# Patient Record
Sex: FEMALE | Race: WHITE | NOT HISPANIC OR LATINO | Employment: STUDENT | URBAN - METROPOLITAN AREA
[De-identification: names, ages, dates, MRNs, and addresses within clinical notes are randomized per-mention and may not be internally consistent; named-entity substitution may affect disease eponyms.]

---

## 2021-05-08 ENCOUNTER — HOSPITAL ENCOUNTER (EMERGENCY)
Facility: CLINIC | Age: 21
Discharge: HOME | End: 2021-05-08
Attending: EMERGENCY MEDICINE | Admitting: EMERGENCY MEDICINE
Payer: COMMERCIAL

## 2021-05-08 ENCOUNTER — APPOINTMENT (OUTPATIENT)
Dept: GENERAL RADIOLOGY | Facility: CLINIC | Age: 21
End: 2021-05-08
Attending: EMERGENCY MEDICINE
Payer: COMMERCIAL

## 2021-05-08 VITALS
HEIGHT: 65 IN | BODY MASS INDEX: 21.66 KG/M2 | HEART RATE: 94 BPM | TEMPERATURE: 97.9 F | OXYGEN SATURATION: 98 % | RESPIRATION RATE: 16 BRPM | WEIGHT: 130 LBS | SYSTOLIC BLOOD PRESSURE: 122 MMHG | DIASTOLIC BLOOD PRESSURE: 87 MMHG

## 2021-05-08 DIAGNOSIS — S93.402A SPRAIN OF LEFT ANKLE, UNSPECIFIED LIGAMENT, INITIAL ENCOUNTER: ICD-10-CM

## 2021-05-08 PROCEDURE — 73610 X-RAY EXAM OF ANKLE: CPT | Mod: 26 | Performed by: RADIOLOGY

## 2021-05-08 PROCEDURE — 250N000013 HC RX MED GY IP 250 OP 250 PS 637: Performed by: EMERGENCY MEDICINE

## 2021-05-08 PROCEDURE — 99284 EMERGENCY DEPT VISIT MOD MDM: CPT | Performed by: EMERGENCY MEDICINE

## 2021-05-08 PROCEDURE — 73610 X-RAY EXAM OF ANKLE: CPT | Mod: LT

## 2021-05-08 RX ORDER — IBUPROFEN 600 MG/1
600 TABLET, FILM COATED ORAL ONCE
Status: COMPLETED | OUTPATIENT
Start: 2021-05-08 | End: 2021-05-08

## 2021-05-08 RX ADMIN — IBUPROFEN 600 MG: 600 TABLET, FILM COATED ORAL at 12:02

## 2021-05-08 ASSESSMENT — ENCOUNTER SYMPTOMS
ABDOMINAL PAIN: 0
NAUSEA: 0
COUGH: 0
HEADACHES: 0
VOMITING: 0
DIFFICULTY URINATING: 0
SORE THROAT: 0
BACK PAIN: 0
SHORTNESS OF BREATH: 0
NECK PAIN: 0
FEVER: 0
DYSURIA: 0
EYE REDNESS: 0
SLEEP DISTURBANCE: 0

## 2021-05-08 ASSESSMENT — MIFFLIN-ST. JEOR: SCORE: 1355.56

## 2021-05-08 NOTE — DISCHARGE INSTRUCTIONS
Wear gel splint.  Use crutches-weightbearing as tolerated.  Ice for 20 minutes at a time, 3-4 times a day and as needed.  Take ibuprofen 600 mg every 6 hours with food as needed.    Follow-up with your primary care provider in 1 week.    Return to the emergency department if any concerns.

## 2021-05-08 NOTE — LETTER
May 8, 2021      To Whom It May Concern:      Julia Briones was seen in our Emergency Department today, 05/08/21.  She was in the emergency department from 10:20 AM until approximately 12:20 PM.  She may return to school.    Sincerely,        NADEEN MANNING MD, MD

## 2021-05-08 NOTE — ED TRIAGE NOTES
Fall off skateboard this AM twisting left ankle. Lateral swelling noted. Pain 7/10. Denies other injuries.

## 2021-05-08 NOTE — ED PROVIDER NOTES
ED Provider Note  [facility]      History     Chief Complaint   Patient presents with     Leg Injury     HPI  Julia Briones is a 20 year old female who presents to the emergency department with left ankle pain.  The patient states she was skateboarding today.  She states that she leaned back too far on the skateboard flew out from underneath her.  She landed on her feet but states her right ankle inverted and she felt a pop.  She has had pain laterally since and difficulty with weightbearing.  She states that she did not fall to the ground, strike her head, or suffer loss of consciousness.  She denies any neck or back pain.  No chest pain or dyspnea.  No abdominal pain.  No upper extremity injury.  Patient denies history of previous injury to this ankle.  She denies any significant past medical history.    Past Medical History  History reviewed. No pertinent past medical history.  History reviewed. No pertinent surgical history.  No current outpatient medications on file.    No Known Allergies  Family History  No family history on file.  Social History   Social History     Tobacco Use     Smoking status: Never Smoker     Smokeless tobacco: Never Used   Substance Use Topics     Alcohol use: Yes     Drug use: Not Currently      Past medical history, past surgical history, medications, allergies, family history, and social history were reviewed with the patient. No additional pertinent items.       Review of Systems   Constitutional: Negative for fever.   HENT: Negative for sore throat.    Eyes: Negative for redness.   Respiratory: Negative for cough and shortness of breath.    Cardiovascular: Negative for chest pain.   Gastrointestinal: Negative for abdominal pain, nausea and vomiting.   Genitourinary: Negative for difficulty urinating and dysuria.   Musculoskeletal: Negative for back pain and neck pain.        Left ankle pain-see HPI   Skin: Negative for rash.   Neurological:  "Negative for headaches.   Psychiatric/Behavioral: Negative for sleep disturbance.   All other systems reviewed and are negative.    A complete review of systems was performed with pertinent positives and negatives noted in the HPI, and all other systems negative.    Physical Exam   BP: 122/87  Pulse: 94  Temp: 97.9  F (36.6  C)  Resp: 16  Height: 165.1 cm (5' 5\")  Weight: 59 kg (130 lb)  SpO2: 98 %  Physical Exam  Vitals signs and nursing note reviewed.   Constitutional:       Appearance: Normal appearance.   HENT:      Head: Normocephalic and atraumatic.   Neck:      Musculoskeletal: Normal range of motion.   Cardiovascular:      Pulses: Normal pulses.           Dorsalis pedis pulses are 2+ on the left side.        Posterior tibial pulses are 2+ on the left side.   Musculoskeletal:      Left knee: Normal. She exhibits normal range of motion. No tenderness found.      Left ankle: She exhibits decreased range of motion (Unable to dorsiflex to neutral.  Plantarflexion full.). Tenderness. Lateral malleolus and medial malleolus tenderness found. No head of 5th metatarsal and no proximal fibula tenderness found. Achilles tendon normal.      Left lower leg: Normal.   Skin:     General: Skin is warm and dry.      Capillary Refill: Capillary refill takes less than 2 seconds.   Neurological:      General: No focal deficit present.      Mental Status: She is alert.      Sensory: No sensory deficit.      Motor: No weakness.         ED Course      Procedures        The medical record was reviewed and interpreted.  Current images reviewed and interpreted: Soft tissue swelling.  No acute fracture..  Results for orders placed or performed during the hospital encounter of 05/08/21   XR Ankle Left 3 Views     Status: None    Narrative    Exam: 3 views of the left ankle dated 5/8/2021.    COMPARISON: None.    CLINICAL HISTORY: Trauma.    FINDINGS: AP, oblique, and lateral views of the left ankle were  obtained. The bones are well " aligned. The joint spaces are  well-maintained. No displaced fractures. Soft tissue swelling  overlying the lateral malleolus.      Impression    IMPRESSION: Soft tissue swelling overlying the lateral malleolus,  likely representing a ligamentous injury. No displaced fractures.  Follow-up radiographs as clinically indicated.    ADÁN MONROE MD         Results for orders placed or performed during the hospital encounter of 05/08/21   XR Ankle Left 3 Views     Status: None    Narrative    Exam: 3 views of the left ankle dated 5/8/2021.    COMPARISON: None.    CLINICAL HISTORY: Trauma.    FINDINGS: AP, oblique, and lateral views of the left ankle were  obtained. The bones are well aligned. The joint spaces are  well-maintained. No displaced fractures. Soft tissue swelling  overlying the lateral malleolus.      Impression    IMPRESSION: Soft tissue swelling overlying the lateral malleolus,  likely representing a ligamentous injury. No displaced fractures.  Follow-up radiographs as clinically indicated.    ADÁN MONROE MD     Medications   ibuprofen (ADVIL/MOTRIN) tablet 600 mg (600 mg Oral Given 5/8/21 1202)        Assessments & Plan (with Medical Decision Making)   20 year old female to the emergency department with left ankle pain and swelling after inversion type injury today.  Differential diagnosis includes ankle sprain, ankle fracture, foot fracture, proximal fibula fracture.  No tenderness of foot or knee so do not suspect fifth metatarsal fracture or proximal fibula fracture.  Will obtain radiographs left ankle.    Left ankle radiograph unremarkable.  Suspect sprain.  Occult fracture possible but unlikely.  Patient uncomfortable in gel splint so provided cam boot.  Will utilize crutches with weightbearing as tolerated.  Primary care follow-up recommended in 1 week.  Return precautions provided.    I have reviewed the nursing notes. I have reviewed the findings, diagnosis, plan and need for follow up with  the patient.    There are no discharge medications for this patient.      Final diagnoses:   Sprain of left ankle, unspecified ligament, initial encounter     Chart documentation was completed with Dragon voice-recognition software. Even though reviewed, this chart may still contain some grammatical, spelling, and word errors.     --  Arias Xiao Md  [facility] EMERGENCY DEPARTMENT  5/8/2021     Arias Xiao MD  05/08/21 1536

## 2021-06-20 ENCOUNTER — HEALTH MAINTENANCE LETTER (OUTPATIENT)
Age: 21
End: 2021-06-20

## 2021-10-10 ENCOUNTER — HEALTH MAINTENANCE LETTER (OUTPATIENT)
Age: 21
End: 2021-10-10

## 2022-07-16 ENCOUNTER — HEALTH MAINTENANCE LETTER (OUTPATIENT)
Age: 22
End: 2022-07-16

## 2022-09-18 ENCOUNTER — HEALTH MAINTENANCE LETTER (OUTPATIENT)
Age: 22
End: 2022-09-18

## 2023-07-30 ENCOUNTER — HEALTH MAINTENANCE LETTER (OUTPATIENT)
Age: 23
End: 2023-07-30